# Patient Record
Sex: FEMALE | Race: AMERICAN INDIAN OR ALASKA NATIVE | URBAN - NONMETROPOLITAN AREA
[De-identification: names, ages, dates, MRNs, and addresses within clinical notes are randomized per-mention and may not be internally consistent; named-entity substitution may affect disease eponyms.]

---
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
## 2021-04-02 ENCOUNTER — HOSPITAL ENCOUNTER (INPATIENT)
Dept: HOSPITAL 11 - JP.ED | Age: 21
LOS: 2 days | Discharge: HOME | DRG: 418 | End: 2021-04-04
Attending: SURGERY | Admitting: SURGERY
Payer: MEDICAID

## 2021-04-02 DIAGNOSIS — Z79.899: ICD-10-CM

## 2021-04-02 DIAGNOSIS — K42.0: ICD-10-CM

## 2021-04-02 DIAGNOSIS — K80.00: Primary | ICD-10-CM

## 2021-04-02 DIAGNOSIS — R59.0: ICD-10-CM

## 2021-04-02 DIAGNOSIS — Z20.822: ICD-10-CM

## 2021-04-02 LAB — SARS-COV-2 RNA RESP QL NAA+PROBE: NEGATIVE

## 2021-04-02 PROCEDURE — C9113 INJ PANTOPRAZOLE SODIUM, VIA: HCPCS

## 2021-04-02 NOTE — EDM.PDOC
ED HPI GENERAL MEDICAL PROBLEM





- General


Chief Complaint: Abdominal Pain


Stated Complaint: MEDICAL VIA NORTH


Time Seen by Provider: 04/02/21 15:40


Source of Information: Reports: Patient, EMS


History Limitations: Reports: No Limitations





- History of Present Illness


INITIAL COMMENTS - FREE TEXT/NARRATIVE: 





21 yo NA female presents via EMS with RUQ abdominal pain that began several hrs 

after eating several large brownies. She has a pHx of biliary colic. She vomited

once since the onset between 7 and 8 am today. No hematemesis. 


Onset: Today, Sudden


Onset Date: 04/02/21


Onset Time: 07:30


Duration: Hour(s):, Constant


Location: Reports: Abdomen (RUQ)


Quality: Reports: Ache


Severity: Moderate


Improves with: Reports: Medication (Got transiet full relief with 100 mcg of 

Fentanyl per EMS)


Worsens with: Reports: Other (unsure)


Context: Reports: Other (See HPI)


Associated Symptoms: Reports: Nausea/Vomiting.  Denies: Fever/Chills


Treatments PTA: Reports: Other (see below) (IV Fentanyl)





- Related Data


                                    Allergies











Allergy/AdvReac Type Severity Reaction Status Date / Time


 


No Known Allergies Allergy   Verified 04/02/21 15:51











Home Meds: 


                                    Home Meds





Etonogestrel [Nexplanon] 68 mg SQ DAILY 06/07/20 [History]











Past Medical History





- Past Health History


Medical/Surgical History: Denies Medical/Surgical History


Psychiatric History: Reports: Suicide Attempt





- Past Surgical History


HEENT Surgical History: Reports: Myringotomy w Tube(s)





Social & Family History





- Caffeine Use


Caffeine Use Comment: unknown





ED ROS GENERAL





- Review of Systems


Review Of Systems: See Below


Constitutional: Reports: No Symptoms


HEENT: Reports: No Symptoms


Respiratory: Reports: No Symptoms


Cardiovascular: Reports: No Symptoms


GI/Abdominal: Reports: Abdominal Pain, Nausea, Vomiting (x 1).  Denies: Black 

Stool, Bloody Stool, Constipation, Diarrhea, Distension, Hematemesis, 

Hematochezia, Melena


: Reports: No Symptoms


Musculoskeletal: Reports: No Symptoms


Skin: Reports: No Symptoms


Neurological: Reports: No Symptoms





ED EXAM, GI/ABD





- Physical Exam


Exam: See Below


Exam Limited By: No Limitations


General Appearance: Alert, WD/WN, No Apparent Distress


Eyes: Bilateral: Normal Appearance


Ears: Normal External Exam, Normal Canal, Hearing Grossly Normal


Nose: Normal Inspection, No Blood


Throat/Mouth: Normal Inspection, Normal Lips, Normal Oropharynx, Normal Voice, 

No Airway Compromise


Head: Atraumatic, Normocephalic


Neck: Normal Inspection


Respiratory/Chest: No Respiratory Distress, Lungs Clear, Normal Breath Sounds, 

No Accessory Muscle Use


Cardiovascular: Regular Rate, Rhythm, No Edema


GI/Abdominal Exam: Normal Bowel Sounds, Soft, Non-Tender, No Distention


Back Exam: Normal Inspection.  No: CVA Tenderness (R), CVA Tenderness (L)


Extremities: Normal Inspection, Normal Range of Motion, Non-Tender, No Pedal 

Edema.  No: Pedal Edema


Neurological: Alert, Oriented, CN II-XII Intact, Normal Cognition, No 

Motor/Sensory Deficits


Psychiatric: Normal Affect, Normal Mood


Skin Exam: Warm, Dry, Intact, Normal Color, No Rash





Course





- Vital Signs


Text/Narrative:: 





Dr. SANTA Hernández called @ [phone]


Last Recorded V/S: 


                                Last Vital Signs











Temp  36.4 C   04/02/21 15:57


 


Pulse  66   04/02/21 15:57


 


Resp  18   04/02/21 15:57


 


BP  146/91 H  04/02/21 15:57


 


Pulse Ox  97   04/02/21 15:57














- Orders/Labs/Meds


Orders: 


                               Active Orders 24 hr











 Category Date Time Status


 


 Abdomen Ltd [US] Stat Exams  04/02/21 16:22 Ordered











Labs: 


                                Laboratory Tests











  04/02/21 04/02/21 04/02/21 Range/Units





  15:57 16:05 16:05 


 


WBC   10.2   (4.5-11.0)  K/uL


 


RBC   5.03   (3.30-5.50)  M/uL


 


Hgb   14.9   (12.0-15.0)  g/dL


 


Hct   44.4   (36.0-48.0)  %


 


MCV   88   (80-98)  fL


 


MCH   30   (27-31)  pg


 


MCHC   34   (32-36)  %


 


Plt Count   407 H   (150-400)  K/uL


 


Sodium    143  (140-148)  mmol/L


 


Potassium    3.9  (3.6-5.2)  mmol/L


 


Chloride    105  (100-108)  mmol/L


 


Carbon Dioxide    24  (21-32)  mmol/L


 


Anion Gap    13.7  (5.0-14.0)  mmol/L


 


BUN    6 L  (7-18)  mg/dL


 


Creatinine    0.6  (0.6-1.0)  mg/dL


 


Est Cr Clr Drug Dosing    152.13  mL/min


 


Estimated GFR (MDRD)    > 60  (>60)  


 


Glucose    102  ()  mg/dL


 


Calcium    8.9  (8.5-10.1)  mg/dL


 


Total Bilirubin    0.4  D  (0.2-1.0)  mg/dL


 


AST    30  (15-37)  U/L


 


ALT    33  (12-78)  U/L


 


Alkaline Phosphatase    96  ()  U/L


 


C-Reactive Protein     (0.0-0.3)  mg/dL


 


Total Protein    7.5  (6.4-8.2)  g/dL


 


Albumin    3.4  (3.4-5.0)  g/dL


 


Globulin    4.1 H  (2.3-3.5)  g/dL


 


Albumin/Globulin Ratio    0.8 L  (1.2-2.2)  


 


Lipase     ()  U/L


 


Urine HCG, Qual  Negative    














  04/02/21 Range/Units





  16:05 


 


WBC   (4.5-11.0)  K/uL


 


RBC   (3.30-5.50)  M/uL


 


Hgb   (12.0-15.0)  g/dL


 


Hct   (36.0-48.0)  %


 


MCV   (80-98)  fL


 


MCH   (27-31)  pg


 


MCHC   (32-36)  %


 


Plt Count   (150-400)  K/uL


 


Sodium   (140-148)  mmol/L


 


Potassium   (3.6-5.2)  mmol/L


 


Chloride   (100-108)  mmol/L


 


Carbon Dioxide   (21-32)  mmol/L


 


Anion Gap   (5.0-14.0)  mmol/L


 


BUN   (7-18)  mg/dL


 


Creatinine   (0.6-1.0)  mg/dL


 


Est Cr Clr Drug Dosing   mL/min


 


Estimated GFR (MDRD)   (>60)  


 


Glucose   ()  mg/dL


 


Calcium   (8.5-10.1)  mg/dL


 


Total Bilirubin   (0.2-1.0)  mg/dL


 


AST   (15-37)  U/L


 


ALT   (12-78)  U/L


 


Alkaline Phosphatase   ()  U/L


 


C-Reactive Protein  0.95 H  (0.0-0.3)  mg/dL


 


Total Protein   (6.4-8.2)  g/dL


 


Albumin   (3.4-5.0)  g/dL


 


Globulin   (2.3-3.5)  g/dL


 


Albumin/Globulin Ratio   (1.2-2.2)  


 


Lipase  75  ()  U/L


 


Urine HCG, Qual   











Meds: 


Medications














Discontinued Medications














Generic Name Dose Route Start Last Admin





  Trade Name Felisa  PRN Reason Stop Dose Admin


 


Hydromorphone HCl  0.5 mg  04/02/21 15:52  04/02/21 16:17





  Hydromorphone 0.5 Mg/0.5 Ml Syringe  IVPUSH  04/02/21 15:53  0.5 mg





  ONETIME ONE   Administration














- Radiology Interpretation


Free Text/Narrative:: 





GB ultrasound-stones, no impaction, common duct slightly increased in size. 





Departure





- Departure


Time of Disposition: 17:25


Disposition: Admitted As Inpatient 67


Condition: Fair


Clinical Impression: 


 Biliary colic








- Discharge Information


*PRESCRIPTION DRUG MONITORING PROGRAM REVIEWED*: Not Applicable


*COPY OF PRESCRIPTION DRUG MONITORING REPORT IN PATIENT PRIYANKA: Not Applicable


Instructions:  Biliary Colic, Adult


Referrals: 


PCP,None [Primary Care Provider] - 


Forms:  ED Department Discharge





Sepsis Event Note (ED)





- Focused Exam


Vital Signs: 


                                   Vital Signs











  Temp Pulse Resp BP Pulse Ox


 


 04/02/21 15:57  36.4 C  66  18  146/91 H  97


 


 04/02/21 15:53  36.4 C  66  18  146/91 H  97














- My Orders


Last 24 Hours: 


My Active Orders





04/02/21 16:22


Abdomen Ltd [US] Stat 














- Assessment/Plan


Last 24 Hours: 


My Active Orders





04/02/21 16:22


Abdomen Ltd [US] Stat

## 2021-04-03 PROCEDURE — 0FT44ZZ RESECTION OF GALLBLADDER, PERCUTANEOUS ENDOSCOPIC APPROACH: ICD-10-PCS | Performed by: SURGERY

## 2021-04-03 PROCEDURE — 0WQF4ZZ REPAIR ABDOMINAL WALL, PERCUTANEOUS ENDOSCOPIC APPROACH: ICD-10-PCS | Performed by: SURGERY

## 2021-04-03 PROCEDURE — 07BD4ZZ EXCISION OF AORTIC LYMPHATIC, PERCUTANEOUS ENDOSCOPIC APPROACH: ICD-10-PCS | Performed by: SURGERY

## 2021-04-03 RX ADMIN — AMPICILLIN SODIUM AND SULBACTAM SODIUM SCH MLS/HR: 2; 1 INJECTION, POWDER, FOR SOLUTION INTRAMUSCULAR; INTRAVENOUS at 19:55

## 2021-04-03 RX ADMIN — HYDROCODONE BITARTRATE AND ACETAMINOPHEN PRN TAB: 5; 325 TABLET ORAL at 16:04

## 2021-04-03 RX ADMIN — HYDROCODONE BITARTRATE AND ACETAMINOPHEN PRN TAB: 5; 325 TABLET ORAL at 20:38

## 2021-04-03 RX ADMIN — AMPICILLIN SODIUM AND SULBACTAM SODIUM SCH MLS/HR: 2; 1 INJECTION, POWDER, FOR SOLUTION INTRAMUSCULAR; INTRAVENOUS at 13:22

## 2021-04-03 RX ADMIN — HYDROCODONE BITARTRATE AND ACETAMINOPHEN PRN TAB: 5; 325 TABLET ORAL at 11:36

## 2021-04-04 VITALS — SYSTOLIC BLOOD PRESSURE: 124 MMHG | HEART RATE: 68 BPM | DIASTOLIC BLOOD PRESSURE: 44 MMHG

## 2021-04-04 RX ADMIN — AMPICILLIN SODIUM AND SULBACTAM SODIUM SCH MLS/HR: 2; 1 INJECTION, POWDER, FOR SOLUTION INTRAMUSCULAR; INTRAVENOUS at 02:21

## 2021-04-04 RX ADMIN — HYDROCODONE BITARTRATE AND ACETAMINOPHEN PRN TAB: 5; 325 TABLET ORAL at 05:31

## 2021-04-05 NOTE — US
Abdomen Ltd

 

CLINICAL HISTORY: Right upper quadrant pain

 

COMPARISON: June 2020.

 

TECHNIQUE: Real-time images were obtained through the right upper quadrant.

 

FINDINGS: The liver is free of mass or biliary dilatation. There is normal

hepatic echotexture. There is upper limits of normal size at 16 cm in length The

gallbladder contains multiple mobile stones. The common bile duct measures 6 mm.

The pancreas is moderately obscured. The right kidney measures 11.3 x 4.0 x 5.0

cm. The IVC is normal.

 

IMPRESSION: Cholelithiasis

 

Common bile duct measures at upper limits of normal at 6 mm

## 2021-04-09 NOTE — DISCH
FINAL DIAGNOSES:

1. Subacute cholecystitis and cholelithiasis.

2. Enlarged doroteo hepatis lymph node.

3. Incarcerated umbilical hernia.

 

OPERATIVE PROCEDURES:  Diagnostic laparoscopy with:

1. Cholecystectomy.

2. Excision of enlarged doroteo hepatis lymph node.

3. Repair of incarcerated umbilical hernia that was done on 04/03.

 

SUMMARY:  This is a 20-year-old female presenting with ongoing right upper quadrant pain.

Workup showed a thickened gallbladder wall with stones.  The patient was admitted on IV

antibiotics and subsequently underwent the above-noted procedures.  The gallbladder was

subacutely inflamed and contained multitude of variably sized stones, and there was enlarged

lymph node in the doroteo hepatis that was excised and the incarcerated umbilical hernia was

likewise identified and repaired concurrently.  Postoperatively, the patient has had no

significant problems.  She is tolerating a diet and will be sent home with Norco 5/325 one

to two tablets q.6 hours p.r.n. pain #30.  Follow up with Jaky Andrews at [facility]

on 04/12/2021.

 

DD:  04/04/2021 08:06:38

DT:  04/09/2021 15:21:16

Job #:  [id number]

## 2021-04-12 NOTE — OR
DATE OF PROCEDURE:  04/03/2021

 

SURGEON:  Manny Hernández MD

 

PREOPERATIVE DIAGNOSIS:  Acute cholecystitis and cholelithiasis.

 

POSTOPERATIVE DIAGNOSES:

1. Subacute cholecystitis and cholelithiasis.

2. Incarcerated umbilical hernia.

3. Enlarged doroteo hepatis lymph node.

 

OPERATIVE PROCEDURES:  Diagnostic laparoscopy with:

1. Cholecystectomy (95909).

2. Excision of doroteo hepatis lymph node (28000).

3. Repair of incarcerated umbilical hernia (54169).

 

ANESTHESIA:  General.

 

INDICATIONS FOR PROCEDURE:  This is a 20-year-old admitted with a picture of acute or

subacute cholecystitis.  The plan is to proceed with diagnostic laparoscopy with

cholecystectomy and other procedures as indicated.  Potential need for an open approach was

gone over, otherwise potential risks including bleeding, infection, injury to the common

bile duct or adjacent viscera, problems with stones migrating into common bile duct

requiring additional procedures for correction were gone over, and the patient wishes to

proceed.

 

DETAILS OF PROCEDURE:  The patient was taken to the operating room and placed in a supine

position.  After general endotracheal anesthesia was induced, the abdomen was prepped and

draped.  A transverse infraumbilical incision was noted to have underlying it an area of

incarcerated umbilical hernia, this contained some incarcerated preperitoneal fat.  A 12 mm

trocar was then placed directly through the center of the hernia, thus displacing the

incarcerated preperitoneal fat into intraperitoneal location.  Peritoneal cavity was

inflated with 15 mmHg with CO2.  Laparoscope was reinserted.  No underlying trocar insertion

site injuries were seen.  A 12 mm epigastric trocar as well as single 5 mm right abdominal

trocar was then placed.  Bilateral transversus abdominis plane blocks were then also

injected and the gallbladder then retracted anteriorly and laterally.  Posterior to the

gallbladder, there was quite a bit of inflammation, but no significant fluid accumulation.

Over the doroteo hepatis area, there was an enlarged reddened lymph node, this was excised and

sent as a separate specimen.

 

At this point, the gallbladder was retracted anteriorly and laterally, and dissection began

on the gallbladder neck, continued around the gallbladder neck and cystic duct junction.

Once that area as well as the adjacent cystic artery were both well delineated, we decided

to take both structures with a surgical stapler.  As the tissues were quite thickened and

friable, this would provide a more secure closure.  This was done with a YANDY purple load.

The gallbladder was then dissected off the gallbladder bed using Harmonic scalpel and

delivered through the epigastric trocar site.  Multiple small stones were present within the

gallbladder.

 

At this point, no further problems were noted.  A drain was placed through the right lateral

trocar site and positioned adjacent to the gallbladder bed and the camera port then brought

back up to the epigastric site where the umbilical hernia sutures were placed with the

endoscopic suture passer using 0 Vicryl stitch closing the umbilical hernia defect with

transverse orientation.  An additional stitch was then placed into the fascia in the

epigastric site and the trocars were removed and the peritoneal cavity deflated.  The

incisions were closed with some 4-0 Vicryl skin stitch and dressing applied.  The patient

was taken to the recovery room in a satisfactory condition.

 

 

 

 

Manny Hernández MD

DD:  04/10/2021 13:26:58

DT:  04/12/2021 14:00:29

Job #:  [id number]

## 2021-11-24 ENCOUNTER — HOSPITAL ENCOUNTER (EMERGENCY)
Dept: HOSPITAL 11 - JP.ED | Age: 21
LOS: 1 days | Discharge: HOME | End: 2021-11-25
Payer: MEDICAID
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
## 2021-11-24 DIAGNOSIS — E66.9: ICD-10-CM

## 2021-11-24 DIAGNOSIS — F10.129: Primary | ICD-10-CM

## 2021-11-25 VITALS — HEART RATE: 84 BPM

## 2021-11-25 VITALS — SYSTOLIC BLOOD PRESSURE: 112 MMHG | DIASTOLIC BLOOD PRESSURE: 71 MMHG

## 2021-11-25 NOTE — EDM.PDOC
ED HPI GENERAL MEDICAL PROBLEM





- General


Chief Complaint: Drug or Alcohol Abuse


Stated Complaint: MEDICAL VIA NORTH


Time Seen by Provider: 11/25/21 00:00


Source of Information: Reports: EMS, Old Records


History Limitations: Reports: Intoxication





- History of Present Illness


INITIAL COMMENTS - FREE TEXT/NARRATIVE: 





20 yo NA female brought in from home by EMS for extreme intoxication. Family 

mentioned she was in a bathtub that did not have water in it. They don't know if

she fell into it or just crawled in? EMS noted no sign of scalp wounds or 

swelling en route. Vitals were reportedly normal. Has a hx of alcohol abuse. 


Onset Date: 11/24/21


Onset Time: 14:00 (reportedly has been drinking heavily since 1400h)


Duration: Hour(s):, Getting Worse


Location: Reports: Generalized


Quality: Reports: Ache (may have a HA on arrival)


Severity: Mild


Improves with: Reports: Other (?)


Worsens with: Reports: Other (unsure)


Context: Reports: Other (See HPI)


Associated Symptoms: Reports: No Other Symptoms


Treatments PTA: Reports: Other (see below) (none)


  ** Headache


Pain Score (Numeric/FACES): 6





- Related Data


                                    Allergies











Allergy/AdvReac Type Severity Reaction Status Date / Time


 


No Known Allergies Allergy   Verified 11/24/21 23:53











Home Meds: 


                                    Home Meds





Etonogestrel [Nexplanon] 68 mg SQ DAILY 06/07/20 [History]











Past Medical History





- Past Health History


Medical/Surgical History: Denies Medical/Surgical History


HEENT History: Reports: Impaired Vision


Psychiatric History: Reports: Suicide Attempt


Endocrine/Metabolic History: Reports: Obesity/BMI 30+





- Past Surgical History


Head Surgeries/Procedures: Reports: None


HEENT Surgical History: Reports: Myringotomy w Tube(s)


Endocrine Surgical History: Reports: None


Dermatological Surgical History: Reports: None





Social & Family History





- Caffeine Use


Caffeine Use: Reports: Coffee, Energy Drinks, Soda, Tea


Caffeine Use Comment: unknown





- Alcohol Use


Date of Last Drink: 11/24/21


Time of Last Drink: 23:00





ED ROS GENERAL





- Review of Systems


Review Of Systems: Unable To Obtain


Reason Not Obtained: due to extreme intoxication


Constitutional: Reports: No Symptoms





ED EXAM, GENERAL





- Physical Exam


Exam: See Below


Exam Limited By: No Limitations


General Appearance: WD/WN, No Apparent Distress, Lethargic, Obese


Eye Exam: Bilateral Eye: Normal Inspection, PERRL


Ears: Normal External Exam, Normal Canal, Hearing Grossly Normal, Normal TMs


Ear Exam: Bilateral Ear: Auricle Normal, Canal Normal, TM normal


Nose: Normal Inspection, No Blood


Throat/Mouth: Normal Inspection, Normal Lips, Normal Voice, No Airway Compromise


Head: Atraumatic, Normocephalic


Neck: Normal Inspection, Supple, Non-Tender


Respiratory/Chest: No Respiratory Distress, Lungs Clear, Normal Breath Sounds, 

No Accessory Muscle Use


Cardiovascular: Regular Rate, Rhythm, No Edema


GI/Abdominal: Soft, Non-Tender


Back Exam: Normal Inspection


Extremities: Normal Inspection, Normal Range of Motion, Non-Tender, No Pedal 

Edema


Neurological: CN II-XII Intact, No Motor/Sensory Deficits, Slow to Respond


Psychiatric: Flat Affect


Skin Exam: Warm, Dry, Intact, Normal Color, No Rash





Course





- Vital Signs


Last Recorded V/S: 


                                Last Vital Signs











Temp  36.9 C   11/24/21 23:53


 


Pulse  84   11/25/21 03:27


 


Resp  18   11/25/21 03:27


 


BP  112/71   11/24/21 23:53


 


Pulse Ox  94 L  11/25/21 03:27














- Orders/Labs/Meds


Orders: 


                               Active Orders 24 hr











 Category Date Time Status


 


 DRUG SCREEN, URINE [URCHEM] Stat Lab  11/25/21 00:13 Ordered











Labs: 


                                Laboratory Tests











  11/24/21 Range/Units





  23:55 


 


Ethyl Alcohol  198  mg/dL














- Re-Assessments/Exams


Free Text/Narrative Re-Assessment/Exam: 





11/25/21 06:07


Is up and walking about the ER this AM with no complaints and is steady on her 

feet. Will call her sister to come and pick her up. 





Departure





- Departure


Time of Disposition: 07:00


Disposition: Home, Self-Care 01


Condition: Fair


Clinical Impression: 


Alcohol intoxication


Qualifiers:


 Complication of substance-induced condition: uncomplicated Qualified Code(s): 

F10.920 - Alcohol use, unspecified with intoxication, uncomplicated








- Discharge Information


*PRESCRIPTION DRUG MONITORING PROGRAM REVIEWED*: Not Applicable


*COPY OF PRESCRIPTION DRUG MONITORING REPORT IN PATIENT PRIYANKA: Not Applicable


Instructions:  Binge-Drinking Information, Adult


Referrals: 


PCP,None [Primary Care Provider] - 


Forms:  ED Department Discharge


Additional Instructions: 


Learn to control your alcohol use or you will face injury or illness. Discuss 

with your doctor if you are not able to control alcohol use. 





Sepsis Event Note (ED)





- Evaluation


Sepsis Screening Result: No Definite Risk





- Focused Exam


Vital Signs: 


                                   Vital Signs











  Temp Pulse Resp BP Pulse Ox


 


 11/25/21 03:27   84  18   94 L


 


 11/24/21 23:53  36.9 C  80  18  112/71  91 L














- My Orders


Last 24 Hours: 


My Active Orders





11/25/21 00:13


DRUG SCREEN, URINE [URCHEM] Stat 














- Assessment/Plan


Last 24 Hours: 


My Active Orders





11/25/21 00:13


DRUG SCREEN, URINE [URCHEM] Stat

## 2022-01-29 ENCOUNTER — HOSPITAL ENCOUNTER (EMERGENCY)
Dept: HOSPITAL 11 - JP.ED | Age: 22
Discharge: HOME | End: 2022-01-29
Payer: MEDICAID

## 2022-01-29 VITALS — DIASTOLIC BLOOD PRESSURE: 102 MMHG | SYSTOLIC BLOOD PRESSURE: 152 MMHG | HEART RATE: 84 BPM

## 2022-01-29 DIAGNOSIS — H66.001: Primary | ICD-10-CM

## 2022-10-24 ENCOUNTER — HOSPITAL ENCOUNTER (EMERGENCY)
Dept: HOSPITAL 11 - JP.ED | Age: 22
Discharge: HOME | End: 2022-10-24
Payer: MEDICAID

## 2022-10-24 VITALS — SYSTOLIC BLOOD PRESSURE: 115 MMHG | HEART RATE: 99 BPM | DIASTOLIC BLOOD PRESSURE: 97 MMHG

## 2022-10-24 DIAGNOSIS — F10.129: Primary | ICD-10-CM

## 2022-10-24 DIAGNOSIS — E66.9: ICD-10-CM

## 2022-10-24 DIAGNOSIS — F17.210: ICD-10-CM

## 2023-10-22 ENCOUNTER — HOSPITAL ENCOUNTER (EMERGENCY)
Dept: HOSPITAL 11 - JP.ED | Age: 23
LOS: 1 days | Discharge: HOME | End: 2023-10-23
Payer: MEDICAID

## 2023-10-22 VITALS — HEART RATE: 103 BPM | DIASTOLIC BLOOD PRESSURE: 80 MMHG | SYSTOLIC BLOOD PRESSURE: 117 MMHG

## 2023-10-22 DIAGNOSIS — E66.9: ICD-10-CM

## 2023-10-22 DIAGNOSIS — F17.210: ICD-10-CM

## 2023-10-22 DIAGNOSIS — F10.920: Primary | ICD-10-CM
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
## 2023-10-22 LAB
ANION GAP SERPL CALC-SCNC: 7.5 MMOL/L (ref 5–14)
BASOPHILS # BLD AUTO: 0.04 K/UL (ref 0–0.1)
BASOPHILS NFR BLD AUTO: 0.6 % (ref 0.1–1.3)
BUN SERPL-MCNC: 4 MG/DL (ref 7–18)
CALCIUM SERPL-MCNC: 7.5 MG/DL (ref 8.5–10.1)
CHLORIDE SERPL-SCNC: 108 MMOL/L (ref 100–108)
CO2 SERPL-SCNC: 28 MMOL/L (ref 21–32)
CREAT CL 24H UR+SERPL-VRATE: 222.54 ML/MIN
CREAT SERPL-MCNC: 0.4 MG/DL (ref 0.6–1)
EOSINOPHIL # BLD AUTO: 0.04 K/UL (ref 0–0.4)
EOSINOPHIL NFR BLD AUTO: 0.6 % (ref 0–5.4)
GLUCOSE SERPL-MCNC: 92 MG/DL (ref 74–106)
HCT VFR BLD AUTO: 38.9 % (ref 34.3–46)
HGB BLD-MCNC: 13.4 G/DL (ref 11.2–15.5)
IMM GRANULOCYTES # BLD: 0.02 K/UL (ref 0–0.23)
IMM GRANULOCYTES NFR BLD: 0.3 % (ref 0–0.7)
LYMPHOCYTES # BLD AUTO: 2.41 K/UL (ref 0.8–3.3)
LYMPHOCYTES NFR BLD AUTO: 35.7 % (ref 11.4–47.7)
MCH RBC QN AUTO: 30.8 PG (ref 31.6–35.5)
MCHC RBC AUTO-ENTMCNC: 34.4 G/DL (ref 31.6–35.5)
MCHC RBC AUTO-ENTMCNC: 89.4 FL (ref 81.4–99)
MONOCYTES # BLD AUTO: 0.24 K/UL (ref 0.2–0.9)
MONOCYTES NFR BLD AUTO: 3.6 % (ref 3.3–12.6)
NEUTROPHILS # BLD AUTO: 4.01 K/UL (ref 1–7.6)
NEUTROPHILS NFR BLD AUTO: 59.2 % (ref 40–78.1)
PLATELET # BLD AUTO: 358 K/UL (ref 130–375)
POTASSIUM SERPL-SCNC: 3.9 MMOL/L (ref 3.6–5.2)
RBC # BLD AUTO: 4.35 M/UL (ref 3.77–5.24)
SODIUM SERPL-SCNC: 143 MMOL/L (ref 140–148)
WBC # BLD AUTO: 6.8 K/UL (ref 3.2–11)

## 2024-12-30 ENCOUNTER — HOSPITAL ENCOUNTER (EMERGENCY)
Dept: HOSPITAL 11 - JP.ED | Age: 24
Discharge: HOME | End: 2024-12-30
Payer: MEDICAID

## 2024-12-30 VITALS — SYSTOLIC BLOOD PRESSURE: 146 MMHG | DIASTOLIC BLOOD PRESSURE: 99 MMHG | HEART RATE: 83 BPM

## 2024-12-30 DIAGNOSIS — E66.9: ICD-10-CM

## 2024-12-30 DIAGNOSIS — K04.7: Primary | ICD-10-CM

## 2024-12-30 DIAGNOSIS — Z72.0: ICD-10-CM

## 2024-12-30 PROCEDURE — 99282 EMERGENCY DEPT VISIT SF MDM: CPT

## 2024-12-30 PROCEDURE — 64400 NJX AA&/STRD TRIGEMINAL NRV: CPT

## 2024-12-30 RX ADMIN — BUPIVACAINE HYDROCHLORIDE AND EPINEPHRINE BITARTRATE ONE ML: 5; .0091 INJECTION, SOLUTION PERINEURAL at 22:25

## 2024-12-30 RX ADMIN — BUPIVACAINE HYDROCHLORIDE AND EPINEPHRINE BITARTRATE ONE: 5; .0091 INJECTION, SOLUTION PERINEURAL at 22:25

## 2024-12-31 ENCOUNTER — HOSPITAL ENCOUNTER (EMERGENCY)
Dept: HOSPITAL 11 - JP.ED | Age: 24
Discharge: HOME | End: 2024-12-31
Payer: MEDICAID

## 2024-12-31 ENCOUNTER — HOSPITAL ENCOUNTER (EMERGENCY)
Dept: HOSPITAL 11 - JP.ED | Age: 24
Discharge: LEFT BEFORE DISCHARGE | End: 2024-12-31
Payer: MEDICAID

## 2024-12-31 VITALS — SYSTOLIC BLOOD PRESSURE: 168 MMHG | HEART RATE: 83 BPM | DIASTOLIC BLOOD PRESSURE: 99 MMHG

## 2024-12-31 DIAGNOSIS — K08.89: Primary | ICD-10-CM

## 2024-12-31 DIAGNOSIS — G89.29: ICD-10-CM

## 2024-12-31 DIAGNOSIS — Z53.21: Primary | ICD-10-CM

## 2024-12-31 DIAGNOSIS — E66.9: ICD-10-CM

## 2024-12-31 PROCEDURE — 99282 EMERGENCY DEPT VISIT SF MDM: CPT

## 2024-12-31 PROCEDURE — 96372 THER/PROPH/DIAG INJ SC/IM: CPT

## 2024-12-31 RX ADMIN — FENTANYL CITRATE ONE MCG: 50 INJECTION, SOLUTION INTRAMUSCULAR; INTRAVENOUS at 14:10

## 2025-03-18 ENCOUNTER — HOSPITAL ENCOUNTER (EMERGENCY)
Dept: HOSPITAL 11 - JP.ED | Age: 25
LOS: 1 days | Discharge: HOME | End: 2025-03-19
Payer: MEDICAID

## 2025-03-18 DIAGNOSIS — F17.210: ICD-10-CM

## 2025-03-18 DIAGNOSIS — F41.9: Primary | ICD-10-CM

## 2025-03-18 DIAGNOSIS — E66.9: ICD-10-CM

## 2025-03-19 VITALS — HEART RATE: 80 BPM | SYSTOLIC BLOOD PRESSURE: 128 MMHG | DIASTOLIC BLOOD PRESSURE: 80 MMHG

## 2025-03-19 LAB
ANION GAP SERPL CALC-SCNC: 9.7 MMOL/L (ref 5–14)
BASOPHILS # BLD AUTO: 0.06 K/UL (ref 0–0.1)
BASOPHILS NFR BLD AUTO: 0.7 % (ref 0.1–1.3)
BUN SERPL-MCNC: 9 MG/DL (ref 7–18)
CALCIUM SERPL-MCNC: 8.6 MG/DL (ref 8.5–10.1)
CHLORIDE SERPL-SCNC: 105 MMOL/L (ref 100–108)
CO2 SERPL-SCNC: 28 MMOL/L (ref 21–32)
CREAT CL 24H UR+SERPL-VRATE: 141.81 ML/MIN
CREAT SERPL-MCNC: 0.6 MG/DL (ref 0.6–1)
CRP SERPL-MCNC: < 0.5 MG/DL (ref ?–0.5)
EOSINOPHIL # BLD AUTO: 0.12 K/UL (ref 0–0.4)
EOSINOPHIL NFR BLD AUTO: 1.4 % (ref 0–5.4)
GLUCOSE SERPL-MCNC: 107 MG/DL (ref 74–106)
HCT VFR BLD AUTO: 37.7 % (ref 34.3–46)
HGB BLD-MCNC: 13.2 G/DL (ref 11.2–15.5)
IMM GRANULOCYTES # BLD: 0.02 K/UL (ref 0–0.23)
IMM GRANULOCYTES NFR BLD: 0.2 % (ref 0–0.7)
LYMPHOCYTES # BLD AUTO: 2.81 K/UL (ref 0.8–3.3)
LYMPHOCYTES NFR BLD AUTO: 33.5 % (ref 11.4–47.7)
MCH RBC QN AUTO: 30.6 PG (ref 31.6–35.5)
MCHC RBC AUTO-ENTMCNC: 35 G/DL (ref 31.6–35.5)
MCHC RBC AUTO-ENTMCNC: 87.5 FL (ref 81.4–99)
MONOCYTES # BLD AUTO: 0.51 K/UL (ref 0.2–0.9)
MONOCYTES NFR BLD AUTO: 6.1 % (ref 3.3–12.6)
NEUTROPHILS # BLD AUTO: 4.88 K/UL (ref 1–7.6)
NEUTROPHILS NFR BLD AUTO: 58.1 % (ref 40–78.1)
PLATELET # BLD AUTO: 305 K/UL (ref 130–375)
POTASSIUM SERPL-SCNC: 3.7 MMOL/L (ref 3.6–5.2)
RBC # BLD AUTO: 4.31 M/UL (ref 3.77–5.24)
SODIUM SERPL-SCNC: 139 MMOL/L (ref 140–148)
WBC # BLD AUTO: 8.4 K/UL (ref 3.2–11)

## 2025-07-17 ENCOUNTER — HOSPITAL ENCOUNTER (EMERGENCY)
Dept: HOSPITAL 11 - JP.ED | Age: 25
Discharge: HOME | End: 2025-07-17
Payer: MEDICAID

## 2025-07-17 VITALS — DIASTOLIC BLOOD PRESSURE: 82 MMHG | SYSTOLIC BLOOD PRESSURE: 146 MMHG | HEART RATE: 91 BPM

## 2025-07-17 DIAGNOSIS — E66.9: ICD-10-CM

## 2025-07-17 DIAGNOSIS — F15.10: Primary | ICD-10-CM

## 2025-07-17 DIAGNOSIS — Z79.899: ICD-10-CM

## 2025-07-17 LAB
ALBUMIN SERPL-MCNC: 3.5 G/DL (ref 3.4–5)
ALBUMIN/GLOB SERPL: 0.9 {RATIO} (ref 1.2–2.2)
ALP SERPL-CCNC: 89 U/L (ref 46–116)
ALT SERPL-CCNC: 25 U/L (ref 12–78)
AMPHET UR QL SCN: (no result)
AMPHET UR QL SCN: (no result)
ANION GAP SERPL CALC-SCNC: 11.1 MMOL/L (ref 5–14)
AST SERPL-CCNC: 18 U/L (ref 15–37)
BARBITURATES UR QL SCN: NEGATIVE
BASOPHILS # BLD AUTO: 0.04 K/UL (ref 0–0.1)
BASOPHILS NFR BLD AUTO: 0.5 % (ref 0.1–1.3)
BENZODIAZ UR QL SCN: NEGATIVE
BILIRUB SERPL-MCNC: 0.3 MG/DL (ref 0.2–1)
BUN SERPL-MCNC: 7 MG/DL (ref 7–18)
BUN/CREAT SERPL: (no result)
CALCIUM SERPL-MCNC: 9.1 MG/DL (ref 8.5–10.1)
CHLORIDE SERPL-SCNC: 103 MMOL/L (ref 100–108)
CO2 SERPL-SCNC: 28 MMOL/L (ref 21–32)
CREAT CL 24H UR+SERPL-VRATE: (no result) ML/MIN
CREAT SERPL-MCNC: 0.7 MG/DL (ref 0.6–1)
EOSINOPHIL # BLD AUTO: 0.07 K/UL (ref 0–0.4)
EOSINOPHIL NFR BLD AUTO: 0.8 % (ref 0–5.4)
GLOBULIN SER-MCNC: 4 G/DL (ref 2.3–3.5)
GLUCOSE SERPL-MCNC: 121 MG/DL (ref 74–106)
HCT VFR BLD AUTO: 41.2 % (ref 34.3–46)
HGB BLD-MCNC: 14.1 G/DL (ref 11.2–15.5)
IMM GRANULOCYTES # BLD: 0.04 K/UL (ref 0–0.23)
IMM GRANULOCYTES NFR BLD: 0.5 % (ref 0–0.7)
LYMPHOCYTES # BLD AUTO: 1.82 K/UL (ref 0.8–3.3)
LYMPHOCYTES NFR BLD AUTO: 20.6 % (ref 11.4–47.7)
MCH RBC QN AUTO: 30.7 PG (ref 31.6–35.5)
MCHC RBC AUTO-ENTMCNC: 34.2 G/DL (ref 31.6–35.5)
MCHC RBC AUTO-ENTMCNC: 89.6 FL (ref 81.4–99)
METHADONE UR QL SCN: NEGATIVE
MONOCYTES # BLD AUTO: 0.37 K/UL (ref 0.2–0.9)
MONOCYTES NFR BLD AUTO: 4.2 % (ref 3.3–12.6)
NEUTROPHILS # BLD AUTO: 6.49 K/UL (ref 1–7.6)
NEUTROPHILS NFR BLD AUTO: 73.4 % (ref 40–78.1)
OXYCODONE UR QL SCN: NEGATIVE
PLATELET # BLD AUTO: 322 K/UL (ref 130–375)
POTASSIUM SERPL-SCNC: 4.1 MMOL/L (ref 3.6–5.2)
PROPOXYPH UR QL SCN: NEGATIVE
PROT SERPL-MCNC: 7.5 G/DL (ref 6.4–8.2)
RBC # BLD AUTO: 4.6 M/UL (ref 3.77–5.24)
SODIUM SERPL-SCNC: 138 MMOL/L (ref 140–148)
THC UR QL SCN>50 NG/ML: (no result)
WBC # BLD AUTO: 8.8 K/UL (ref 3.2–11)